# Patient Record
Sex: FEMALE | Race: WHITE | URBAN - METROPOLITAN AREA
[De-identification: names, ages, dates, MRNs, and addresses within clinical notes are randomized per-mention and may not be internally consistent; named-entity substitution may affect disease eponyms.]

---

## 2018-12-19 ENCOUNTER — OFFICE VISIT (OUTPATIENT)
Dept: SURGERY | Facility: CLINIC | Age: 23
End: 2018-12-19
Payer: COMMERCIAL

## 2018-12-19 VITALS — HEIGHT: 64 IN | WEIGHT: 159.81 LBS | BODY MASS INDEX: 27.28 KG/M2

## 2018-12-19 DIAGNOSIS — N62 MACROMASTIA: Primary | ICD-10-CM

## 2018-12-19 PROCEDURE — 99243 OFF/OP CNSLTJ NEW/EST LOW 30: CPT | Performed by: SURGERY

## 2018-12-19 RX ORDER — LORATADINE 10 MG/1
CAPSULE, LIQUID FILLED ORAL
COMMUNITY

## 2018-12-19 RX ORDER — RIZATRIPTAN BENZOATE 10 MG/1
10 TABLET ORAL AS NEEDED
COMMUNITY

## 2018-12-19 NOTE — CONSULTS
New Patient Consultation    Chief Complaint: macromastia  Patient presents with:  Consult: mammoplasty. Dr. Skylar Valle referring      History of Present Illness:   Cleopatra An is a 26year old female referred by Dr. Skylar Valle for evaluation of macromastia.    Pt re history of hives, hay fever, angioedema or anaphylaxis.   HEENT:    The patient denies ear pain, ear drainage, hearing loss, change in vision, double vision, cataracts, glaucoma, nasal congestion, nosebleed, hoarseness, sore throat, or swollen glands  Respi relationship, bipolar disorder, sleep disturbance, anxiety, depression or feeling of despair. Physical Exam:    Ht 1.626 m (5' 4\")   Wt 72.5 kg (159 lb 12.8 oz)   BMI 27.43 kg/m²     Constitutional: The patient is an alert, oriented and well-developed. were spent counseling the patient and coordinating care. The different treatment options were discussed with the patient. The procedures and the postoperative care was discussed in detail.   Potential risks complications benefits and alternatives were d